# Patient Record
Sex: FEMALE | Race: BLACK OR AFRICAN AMERICAN | Employment: FULL TIME | URBAN - METROPOLITAN AREA
[De-identification: names, ages, dates, MRNs, and addresses within clinical notes are randomized per-mention and may not be internally consistent; named-entity substitution may affect disease eponyms.]

---

## 2017-09-19 ENCOUNTER — OFFICE VISIT (OUTPATIENT)
Dept: FAMILY MEDICINE | Facility: CLINIC | Age: 22
End: 2017-09-19
Payer: COMMERCIAL

## 2017-09-19 VITALS
DIASTOLIC BLOOD PRESSURE: 63 MMHG | WEIGHT: 144 LBS | TEMPERATURE: 97.9 F | BODY MASS INDEX: 21.33 KG/M2 | SYSTOLIC BLOOD PRESSURE: 108 MMHG | HEART RATE: 92 BPM | HEIGHT: 69 IN | OXYGEN SATURATION: 98 %

## 2017-09-19 DIAGNOSIS — J31.0 OTHER CHRONIC RHINITIS: ICD-10-CM

## 2017-09-19 DIAGNOSIS — Z00.00 ROUTINE GENERAL MEDICAL EXAMINATION AT A HEALTH CARE FACILITY: Primary | ICD-10-CM

## 2017-09-19 DIAGNOSIS — Z11.3 SCREEN FOR STD (SEXUALLY TRANSMITTED DISEASE): ICD-10-CM

## 2017-09-19 PROCEDURE — 87491 CHLMYD TRACH DNA AMP PROBE: CPT | Performed by: FAMILY MEDICINE

## 2017-09-19 PROCEDURE — 87591 N.GONORRHOEAE DNA AMP PROB: CPT | Performed by: FAMILY MEDICINE

## 2017-09-19 PROCEDURE — 99385 PREV VISIT NEW AGE 18-39: CPT | Performed by: FAMILY MEDICINE

## 2017-09-19 RX ORDER — BENZOCAINE/MENTHOL 6 MG-10 MG
LOZENGE MUCOUS MEMBRANE DAILY
COMMUNITY
End: 2020-03-09

## 2017-09-19 RX ORDER — FLUTICASONE PROPIONATE 50 MCG
1-2 SPRAY, SUSPENSION (ML) NASAL DAILY
Qty: 1 BOTTLE | Refills: 11 | Status: SHIPPED | OUTPATIENT
Start: 2017-09-19 | End: 2020-03-09
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
## 2017-09-19 NOTE — NURSING NOTE
"Chief Complaint   Patient presents with     Physical       Initial /63  Pulse 92  Temp 97.9  F (36.6  C) (Oral)  Ht 5' 9\" (1.753 m)  Wt 144 lb (65.3 kg)  SpO2 98%  Breastfeeding? No  BMI 21.27 kg/m2 Estimated body mass index is 21.27 kg/(m^2) as calculated from the following:    Height as of this encounter: 5' 9\" (1.753 m).    Weight as of this encounter: 144 lb (65.3 kg).  Medication Reconciliation: complete   Karen Anne MA      "

## 2017-09-19 NOTE — MR AVS SNAPSHOT
After Visit Summary   9/19/2017    Joseph Alfonso    MRN: [id number]           Patient Information     Date Of Birth          1995        Visit Information        Provider Department      9/19/2017 9:00 AM Jimmy Wells MD [facility]        Today's Diagnoses     Routine general medical examination at a health care facility    -  1    Other chronic rhinitis        Screen for STD (sexually transmitted disease)          Care Instructions      Preventive Health Recommendations  Female Ages 18 to 25     Yearly exam:     See your health care provider every year in order to  o Review health changes.   o Discuss preventive care.    o Review your medicines if your doctor has prescribed any.      You should be tested each year for STDs (sexually transmitted diseases).       After age 20, talk to your provider about how often you should have cholesterol testing.      Starting at age 21, get a Pap test every three years. If you have an abnormal result, your doctor may have you test more often.      If you are at risk for diabetes, you should have a diabetes test (fasting glucose).     Shots:     Get a flu shot each year.     Get a tetanus shot every 10 years.     Consider getting the shot (vaccine) that prevents cervical cancer (Gardasil).    Nutrition:     Eat at least 5 servings of fruits and vegetables each day.    Eat whole-grain bread, whole-wheat pasta and brown rice instead of white grains and rice.    Talk to your provider about Calcium and Vitamin D.     Lifestyle    Exercise at least 150 minutes a week each week (30 minutes a day, 5 days a week). This will help you control your weight and prevent disease.    Limit alcohol to one drink per day.    No smoking.     Wear sunscreen to prevent skin cancer.    See your dentist every six months for an exam and cleaning.          Follow-ups after your visit        Your next 10 appointments already scheduled     Nov 10, 2017  " 9:30 AM CST   (Arrive by 9:15 AM)   New Patient Visit with Tushar Lan MD   [facility] ([facility])    [address]  [address] [facility] [address] [phone]              Who to contact     If you have questions or need follow up information about today's clinic visit or your schedule please contact [facility] directly at [phone].  Normal or non-critical lab and imaging results will be communicated to you by [employer]hart, letter or phone within 4 business days after the clinic has received the results. If you do not hear from us within 7 days, please contact the clinic through [employer]hart or phone. If you have a critical or abnormal lab result, we will notify you by phone as soon as possible.  Submit refill requests through [employer] or call your pharmacy and they will forward the refill request to us. Please allow 3 business days for your refill to be completed.          Additional Information About Your Visit        [employer] Information     [employer] lets you send messages to your doctor, view your test results, renew your prescriptions, schedule appointments and more. To sign up, go to [email or web address] . Click on \"Log in\" on the left side of the screen, which will take you to the Welcome page. Then click on \"Sign up Now\" on the right side of the page.     You will be asked to enter the access code listed below, as well as some personal information. Please follow the directions to create your username and password.     Your access code is: [email or web address]  Expires: 2017  1:28 PM     Your access code will  in 90 days. If you need help or a new code, please call your [facility] or [phone].        Care EveryWhere ID     This is your Care EveryWhere ID. This could be used by other organizations to access your [facility] medical records  [id number]        Your Vitals Were     Pulse Temperature Height Pulse Oximetry " "Breastfeeding? BMI (Body Mass Index)    92 97.9  F (36.6  C) (Oral) 5' 9\" (1.753 m) 98% No 21.27 kg/m2       Blood Pressure from Last 3 Encounters:   09/19/17 108/63    Weight from Last 3 Encounters:   09/19/17 144 lb (65.3 kg)   05/23/14 135 lb (61.2 kg) (65 %)*     * Growth percentiles are based on [facility] 2-20 Years data.              We Performed the Following     Chlamydia trachomatis PCR     Neisseria gonorrhoeae PCR          Today's Medication Changes          These changes are accurate as of: 9/19/17  9:38 AM.  If you have any questions, ask your nurse or doctor.               Start taking these medicines.        Dose/Directions    fluticasone 50 MCG/ACT spray   Commonly known as:  FLONASE   Used for:  Other chronic rhinitis   Started by:  Jimmy Wells MD        Dose:  1-2 spray   Spray 1-2 sprays into both nostrils daily   Quantity:  1 Bottle   Refills:  11            Where to get your medicines      These medications were sent to [facility] - [place], [place] - [address]  [address], [facility] [address]     Phone:  [phone]     fluticasone 50 MCG/ACT spray                Primary Care Provider Office Phone # Fax #    Chele [address] [phone]       [address] [address]        Equal Access to Services     DIANE FARMER AH: Hadii misbah dumont hadasho Someaganali, waaxda luqadaha, qaybta kaalmada adeegyada, gin fay. So [facility] [phone].    ATENCIÓN: Si habla español, tiene a morrell disposición servicios gratuitos de asistencia lingüística. Llame al [phone].    We comply with applicable federal civil rights laws and [place] laws. We do not discriminate on the basis of race, color, national origin, age, disability sex, sexual orientation or gender identity.            Thank you!     Thank you for choosing [facility]  for your care. Our goal is " always to provide you with excellent care. Hearing back from our patients is one way we can continue to improve our services. Please take a few minutes to complete the written survey that you may receive in the mail after your visit with us. Thank you!             Your Updated Medication List - Protect others around you: Learn how to safely use, store and throw away your medicines at www.disposemymeds.org.          This list is accurate as of: 9/19/17  9:38 AM.  Always use your most recent med list.                   Brand Name Dispense Instructions for use Diagnosis    fluticasone 50 MCG/ACT spray    FLONASE    1 Bottle    Spray 1-2 sprays into both nostrils daily    Other chronic rhinitis       hydrocortisone 1 % cream    CORTAID     Apply topically daily Apply to face daily

## 2017-09-20 LAB
C TRACH DNA SPEC QL NAA+PROBE: NEGATIVE
N GONORRHOEA DNA SPEC QL NAA+PROBE: NEGATIVE
SPECIMEN SOURCE: NORMAL
SPECIMEN SOURCE: NORMAL

## 2017-09-23 ENCOUNTER — HEALTH MAINTENANCE LETTER (OUTPATIENT)
Age: 22
End: 2017-09-23

## 2017-10-03 ENCOUNTER — OFFICE VISIT (OUTPATIENT)
Dept: FAMILY MEDICINE | Facility: CLINIC | Age: 22
End: 2017-10-03
Payer: COMMERCIAL

## 2017-10-03 VITALS
SYSTOLIC BLOOD PRESSURE: 111 MMHG | OXYGEN SATURATION: 97 % | TEMPERATURE: 99 F | WEIGHT: 141.4 LBS | BODY MASS INDEX: 20.88 KG/M2 | HEART RATE: 90 BPM | DIASTOLIC BLOOD PRESSURE: 68 MMHG

## 2017-10-03 DIAGNOSIS — J01.90 ACUTE SINUSITIS WITH SYMPTOMS > 10 DAYS: Primary | ICD-10-CM

## 2017-10-03 DIAGNOSIS — L60.8 CHANGE IN NAIL APPEARANCE: ICD-10-CM

## 2017-10-03 PROCEDURE — 99213 OFFICE O/P EST LOW 20 MIN: CPT | Performed by: PHYSICIAN ASSISTANT

## 2017-10-03 NOTE — NURSING NOTE
"Chief Complaint   Patient presents with     Sinus Problem     Health Maintenance     HPV, Tetanus, Pap, and Influenza        Initial /68 (BP Location: Right arm, Patient Position: Chair, Cuff Size: Adult Regular)  Pulse 90  Temp 99  F (37.2  C) (Oral)  Wt 141 lb 6.4 oz (64.1 kg)  SpO2 97%  Breastfeeding? No  BMI 20.88 kg/m2 Estimated body mass index is 20.88 kg/(m^2) as calculated from the following:    Height as of 9/19/17: 5' 9\" (1.753 m).    Weight as of this encounter: 141 lb 6.4 oz (64.1 kg).  Medication Reconciliation: complete   KAITLYNN Dowell MA      "

## 2017-10-03 NOTE — MR AVS SNAPSHOT
After Visit Summary   10/3/2017    Joseph Alfonso    MRN: [id number]           Patient Information     Date Of Birth          1995        Visit Information        Provider Department      10/3/2017 11:20 AM Brenda Guzman PA-C [facility]        Today's Diagnoses     Acute sinusitis with symptoms > 10 days    -  1    Change in nail appearance          Care Instructions    Biotin           Follow-ups after your visit        Follow-up notes from your care team     Return in about 1 year (around 10/3/2018) for Physical Exam.      Your next 10 appointments already scheduled     Nov 10, 2017  9:30 AM CST   (Arrive by 9:15 AM)   New Patient Visit with Tushar Lan MD   [facility] ([facility])    [address] [place] [address]  3rd Floor  [facility] [address] [phone]              Who to contact     If you have questions or need follow up information about today's clinic visit or your schedule please contact [facility] directly at [phone].  Normal or non-critical lab and imaging results will be communicated to you by [employer], letter or phone within 4 business days after the clinic has received the results. If you do not hear from us within 7 days, please contact the clinic through [employer] or phone. If you have a critical or abnormal lab result, we will notify you by phone as soon as possible.  Submit refill requests through [employer] or call your pharmacy and they will forward the refill request to us. Please allow 3 business days for your refill to be completed.          Additional Information About Your Visit        MyChart Information     [employer] gives you secure access to your electronic health record. If you see a primary care provider, you can also send messages to your care team and make appointments. If you have questions, please call your primary care clinic.  If you do not have a primary care  provider, please call [phone] and they will assist you.        Care EveryWhere ID     This is your Care EveryWhere ID. This could be used by other organizations to access your [facility] medical records  [id number]        Your Vitals Were     Pulse Temperature Pulse Oximetry Breastfeeding? BMI (Body Mass Index)       90 99  F (37.2  C) (Oral) 97% No 20.88 kg/m2        Blood Pressure from Last 3 Encounters:   10/03/17 111/68   09/19/17 108/63    Weight from Last 3 Encounters:   10/03/17 141 lb 6.4 oz (64.1 kg)   09/19/17 144 lb (65.3 kg)   05/23/14 135 lb (61.2 kg) (65 %)*     * Growth percentiles are based on [facility] 2-20 Years data.              Today, you had the following     No orders found for display         Today's Medication Changes          These changes are accurate as of: 10/3/17 12:29 PM.  If you have any questions, ask your nurse or doctor.               Start taking these medicines.        Dose/Directions    amoxicillin-clavulanate 875-125 MG per tablet   Commonly known as:  AUGMENTIN   Used for:  Acute sinusitis with symptoms > 10 days   Started by:  Brenda Guzman PA-C        Dose:  1 tablet   Take 1 tablet by mouth 2 times daily   Quantity:  20 tablet   Refills:  0            Where to get your medicines      These medications were sent to [facility] [place], [place] - [address]  [address], [facility] [address]     Phone:  [phone]     amoxicillin-clavulanate 875-125 MG per tablet                Primary Care Provider Office Phone # Fax #    Chele [address] [phone]       [address] [address]        Equal Access to Services     DIANE FARMER AH: Katey Duque, walinetteda luqadaha, qaybta kaalmada ademalikyada, gin fay. So [facility] [phone].    ATENCIÓN: Si habla español, tiene a morrell disposición servicios gratuitos de asistencia  lingüística. Maria Fernanda al [phone].    We comply with applicable federal civil rights laws and [place] laws. We do not discriminate on the basis of race, color, national origin, age, disability, sex, sexual orientation, or gender identity.            Thank you!     Thank you for choosing [facility]  for your care. Our goal is always to provide you with excellent care. Hearing back from our patients is one way we can continue to improve our services. Please take a few minutes to complete the written survey that you may receive in the mail after your visit with us. Thank you!             Your Updated Medication List - Protect others around you: Learn how to safely use, store and throw away your medicines at www.disposemymeds.org.          This list is accurate as of: 10/3/17 12:29 PM.  Always use your most recent med list.                   Brand Name Dispense Instructions for use Diagnosis    amoxicillin-clavulanate 875-125 MG per tablet    AUGMENTIN    20 tablet    Take 1 tablet by mouth 2 times daily    Acute sinusitis with symptoms > 10 days       fluticasone 50 MCG/ACT spray    FLONASE    1 Bottle    Spray 1-2 sprays into both nostrils daily    Other chronic rhinitis       hydrocortisone 1 % cream    CORTAID     Apply topically daily Apply to face daily

## 2017-10-03 NOTE — PROGRESS NOTES
SUBJECTIVE:   Joseph Alfonso is a 22 year old female who presents to clinic today for the following health issues:      ENT Symptoms             Symptoms: cc Present Absent Comment   Fever/Chills   x    Fatigue  x     Muscle Aches   x    Eye Irritation   x    Sneezing   x    Nasal Alejandro/Drg  x     Sinus Pressure/Pain  x     Loss of smell  x     Dental pain  x     Sore Throat   x    Swollen Glands   x    Ear Pain/Fullness   x    Cough  x  That improved with flonase.     Wheeze  x     Chest Pain   x    Shortness of breath   x    Rash   x    Other   x      Symptom duration:  1 month 1 week   Symptom severity:  moderate   Treatments tried:  OTC Flonase helps a bit, OTC Asprin and Tylenol    Contacts:  Work      Hx of seasonal allergies but this feels different.    No hx of lung problems.    Works at a .        Thumb nails split and on toes too recently.  Hands x 6 months.  No hair changes.  Does have some hot and cold intolerance but this if for years.  No weight changes.  No blood or mucus in stools.      Problem list and histories reviewed & adjusted, as indicated.  Additional history: as documented    There is no problem list on file for this patient.    Past Surgical History:   Procedure Laterality Date     REMOVE TUBE, MYRINGOTOMY, COMBINED Bilateral        Social History   Substance Use Topics     Smoking status: Never Smoker     Smokeless tobacco: Never Used     Alcohol use Yes     Family History   Problem Relation Age of Onset     Asthma No family hx of      C.A.D. No family hx of      DIABETES No family hx of      Hypertension No family hx of              Reviewed and updated as needed this visit by clinical staffTobacco  Allergies  Meds  Med Hx  Surg Hx  Fam Hx  Soc Hx      Reviewed and updated as needed this visit by Provider         ROS:  As above    OBJECTIVE:     /68 (BP Location: Right arm, Patient Position: Chair, Cuff Size: Adult Regular)  Pulse 90  Temp 99  F (37.2  C) (Oral)   Wt 141 lb 6.4 oz (64.1 kg)  SpO2 97%  Breastfeeding? No  BMI 20.88 kg/m2  Body mass index is 20.88 kg/(m^2).  GENERAL: healthy, alert and no distress  HENT: ear canals and TM's normal, oropharynx clear, oral mucous membranes moist and congestion   NECK: no adenopathy and no asymmetry, masses, or scars  RESP: lungs clear to auscultation - no rales, rhonchi or wheezes  CV: regular rates and rhythm, normal S1 S2, no S3 or S4 and no murmur, click or rub  Skin: thin thumb nails     Diagnostic Test Results:  none     ASSESSMENT/PLAN:       1. Acute sinusitis with symptoms > 10 days  Follow up as needed  - amoxicillin-clavulanate (AUGMENTIN) 875-125 MG per tablet; Take 1 tablet by mouth 2 times daily  Dispense: 20 tablet; Refill: 0    2. Change in nail appearance  Likely related to contact of something or normal wear.  For now no concerns.        FUTURE APPOINTMENTS:       - Follow-up for annual visit or as needed    Brenda Guzman PA-C  [facility]

## 2018-01-24 ENCOUNTER — OFFICE VISIT (OUTPATIENT)
Dept: FAMILY MEDICINE | Facility: CLINIC | Age: 23
End: 2018-01-24
Payer: COMMERCIAL

## 2018-01-24 VITALS
SYSTOLIC BLOOD PRESSURE: 108 MMHG | TEMPERATURE: 97.7 F | OXYGEN SATURATION: 98 % | RESPIRATION RATE: 18 BRPM | DIASTOLIC BLOOD PRESSURE: 66 MMHG | BODY MASS INDEX: 20.92 KG/M2 | WEIGHT: 138 LBS | HEART RATE: 88 BPM | HEIGHT: 68 IN

## 2018-01-24 DIAGNOSIS — R07.0 THROAT PAIN: ICD-10-CM

## 2018-01-24 DIAGNOSIS — J01.20 ACUTE ETHMOIDAL SINUSITIS, RECURRENCE NOT SPECIFIED: Primary | ICD-10-CM

## 2018-01-24 DIAGNOSIS — R09.81 NASAL SINUS CONGESTION: ICD-10-CM

## 2018-01-24 PROCEDURE — 99213 OFFICE O/P EST LOW 20 MIN: CPT | Performed by: FAMILY MEDICINE

## 2018-01-24 RX ORDER — PSEUDOEPHEDRINE HCL 120 MG/1
120 TABLET, FILM COATED, EXTENDED RELEASE ORAL EVERY 12 HOURS
Qty: 28 TABLET | Refills: 0 | Status: SHIPPED | OUTPATIENT
Start: 2018-01-24 | End: 2020-03-09

## 2018-01-24 NOTE — PATIENT INSTRUCTIONS
Sinusitis (No Antibiotics)    The sinuses are air-filled spaces within the bones of the face. They connect to the inside of the nose. Sinusitis is an inflammation of the tissue lining the sinus cavity. Sinus inflammation can occur during a cold. It can also be due to allergies to pollens and other particles in the air. It can cause symptoms such as sinus congestion, headache, sore throat, facial swelling and fullness. It may also cause a low-grade fever. No infection is present, and no antibiotic treatment is needed.  Home care    Drink plenty of water, hot tea, and other liquids. This may help thin mucus. It also may promote sinus drainage.    Heat may help soothe painful areas of the face. Use a towel soaked in hot water. Or,  the shower and direct the hot spray onto your face. Using a vaporizer along with a menthol rub at night may also help.     An expectorant containing guaifenesin may help thin the mucus and promote drainage from the sinuses.    Over-the-counter decongestants may be used unless a similar medicine was prescribed. Nasal sprays work the fastest. Use one that contains phenylephrine or oxymetazoline. First blow the nose gently. Then use the spray. Do not use these medicines more often than directed on the label or symptoms may get worse. You may also use tablets containing pseudoephedrine. Avoid products that combine ingredients, because side effects may be increased. Read labels. You can also ask the pharmacist for help. (NOTE: Persons with high blood pressure should not use decongestants. They can raise blood pressure.)    Over-the-counter antihistamines may help if allergies contributed to your sinusitis.      Use acetaminophen or ibuprofen to control pain, unless another pain medicine was prescribed. (If you have chronic liver or kidney disease or ever had a stomach ulcer, talk with your doctor before using these medicines. Aspirin should never be used in anyone under 18 years of age  who is ill with a fever. It may cause severe liver damage.)    Use nasal rinses or irrigation as instructed by your health care provider.    Don't smoke. This can worsen symptoms.  Follow-up care  Follow up with your healthcare provider or our staff if you are not improving within the next week.  When to seek medical advice  Call your healthcare provider if any of these occur:    Green or yellow discharge from the nose or into the throat    Facial pain or headache becoming more severe    Stiff neck    Unusual drowsiness or confusion    Swelling of the forehead or eyelids    Vision problems, including blurred or double vision    Fever of 100.4 F (38 C) or higher, or as directed by your healthcare provider    Seizure    Breathing problems    Symptoms not resolving within 10 days  Date Last Reviewed: 4/13/2015 2000-2017 [employer]. [address] [place] [address]. All rights reserved. This information is not intended as a substitute for professional medical care. Always follow your healthcare professional's instructions.      [facility]    If you have any questions regarding to your visit please contact your care team:       Team Purple:   Clinic Hours Telephone Number   Dr. Sruthi Suarez   7am-7pm  Monday - Thursday   7am-5pm  Fridays  [phone]  (Appointment scheduling available 24/7)    Questions about your Visit?   Team Line:  [phone]   Urgent Care - [facility] - 11am-9pm Monday-Friday Saturday-Sunday- 9am-5pm   [place] - 5pm-9pm Monday-Friday Saturday-Sunday- 9am-5pm  [phone] - [place]   [phone] - [place]       What options do I have for visits at the clinic other than the traditional office visit?  To expand how we care for you, many of our providers are utilizing electronic visits (e-visits) and telephone visits, when medically appropriate, for interactions  with their patients rather than a visit in the clinic.   We also offer nurse visits for many medical concerns. Just like any other service, we will bill your insurance company for this type of visit based on time spent on the phone with your provider. Not all insurance companies cover these visits. Please check with your medical insurance if this type of visit is covered. You will be responsible for any charges that are not paid by your insurance.      E-visits via [employer]:  generally incur a $35.00 fee.  Telephone visits:  Time spent on the phone: *charged based on time that is spent on the phone in increments of 10 minutes. Estimated cost:   5-10 mins $30.00   11-20 mins. $59.00   21-30 mins. $85.00     Use [employer] (secure email communication and access to your chart) to send your primary care provider a message or make an appointment. Ask someone on your Team how to sign up for [employer].  For a Price Quote for your services, please call our Consumer Price Line at [phone].  As always, Thank you for trusting us with your health care needs!  Adriana Martinez

## 2018-01-24 NOTE — PROGRESS NOTES
"  SUBJECTIVE:   Joseph Alfonso is a 22 year old female who presents to clinic today for the following health issues:    RESPIRATORY SYMPTOMS      Duration: 3 days    Description  nasal congestion, rhinorrhea, sore throat, facial pain/pressure, cough, wheezing, fever, chills, headache, fatigue/malaise, hoarse voice dizziness and headaches    Severity: moderate    Accompanying signs and symptoms: None    History (predisposing factors):  none    Precipitating or alleviating factors: None    Therapies tried and outcome:  rest and fluids    PROBLEMS TO ADD ON...    Problem list and histories reviewed & adjusted, as indicated.  Additional history: as documented    There is no problem list on file for this patient.    Past Surgical History:   Procedure Laterality Date     REMOVE TUBE, MYRINGOTOMY, COMBINED Bilateral        Social History   Substance Use Topics     Smoking status: Never Smoker     Smokeless tobacco: Never Used     Alcohol use Yes     Family History   Problem Relation Age of Onset     Asthma No family hx of      C.A.D. No family hx of      DIABETES No family hx of      Hypertension No family hx of          Reviewed and updated as needed this visit by clinical staff  Tobacco  Allergies  Meds       ROS:  Constitutional, cardiovascular, pulmonary, gi and gu systems are negative, except as otherwise noted.    OBJECTIVE:     /66  Pulse 88  Temp 97.7  F (36.5  C) (Oral)  Resp 18  Ht 5' 8.42\" (1.738 m)  Wt 138 lb (62.6 kg)  SpO2 98%  BMI 20.72 kg/m2  Body mass index is 20.72 kg/(m^2).  GENERAL: healthy, alert and no distress  HENT: ear canals and TM's normal. Nasal and sinus congestion, tenderness over the ethmoid sinuses.   NECK: no adenopathy and thyroid normal to palpation  RESP: lungs clear to auscultation - no rales, rhonchi or wheezes  CV: regular rate and rhythm, no murmur, click or rub, no peripheral edema   MS: no gross musculoskeletal defects noted, no edema    Diagnostic Test " Results:  none     ASSESSMENT/PLAN:     (J01.20) Acute ethmoidal sinusitis, recurrence not specified  (primary encounter diagnosis)  Comment: I discussed the pathophysiology of sinus pressure/sinusitis and treatment options with emphasis on healty lifestyle and opening up natural drainage passages.  I discussed the risks and benefits of various over the counter and prescription treatments including short courses of decongestant nasal sprays.  Recheck if not improving as expected  Plan: pseudoePHEDrine (SUDAFED) 120 MG 12 hr tablet            Flonase.    (R09.81) Nasal sinus congestion  Plan: pseudoePHEDrine (SUDAFED) 120 MG 12 hr tablet            Flonase.    (R07.0) Throat pain  Comment: From drainage    Call or return to clinic prn if these symptoms worsen or fail to improve as anticipated in 1 week will consider adding antibiotic.    Geovanny Lester MD  [facility]

## 2018-01-24 NOTE — MR AVS SNAPSHOT
After Visit Summary   1/24/2018    Joseph Alfonso    MRN: [id number]           Patient Information     Date Of Birth          1995        Visit Information        Provider Department      1/24/2018 11:40 AM Geovanny Lester MD [facility]        Today's Diagnoses     Acute ethmoidal sinusitis, recurrence not specified    -  1    Nasal sinus congestion        Throat pain          Care Instructions      Sinusitis (No Antibiotics)    The sinuses are air-filled spaces within the bones of the face. They connect to the inside of the nose. Sinusitis is an inflammation of the tissue lining the sinus cavity. Sinus inflammation can occur during a cold. It can also be due to allergies to pollens and other particles in the air. It can cause symptoms such as sinus congestion, headache, sore throat, facial swelling and fullness. It may also cause a low-grade fever. No infection is present, and no antibiotic treatment is needed.  Home care    Drink plenty of water, hot tea, and other liquids. This may help thin mucus. It also may promote sinus drainage.    Heat may help soothe painful areas of the face. Use a towel soaked in hot water. Or,  the shower and direct the hot spray onto your face. Using a vaporizer along with a menthol rub at night may also help.     An expectorant containing guaifenesin may help thin the mucus and promote drainage from the sinuses.    Over-the-counter decongestants may be used unless a similar medicine was prescribed. Nasal sprays work the fastest. Use one that contains phenylephrine or oxymetazoline. First blow the nose gently. Then use the spray. Do not use these medicines more often than directed on the label or symptoms may get worse. You may also use tablets containing pseudoephedrine. Avoid products that combine ingredients, because side effects may be increased. Read labels. You can also ask the pharmacist for help. (NOTE: Persons with high blood  pressure should not use decongestants. They can raise blood pressure.)    Over-the-counter antihistamines may help if allergies contributed to your sinusitis.      Use acetaminophen or ibuprofen to control pain, unless another pain medicine was prescribed. (If you have chronic liver or kidney disease or ever had a stomach ulcer, talk with your doctor before using these medicines. Aspirin should never be used in anyone under 18 years of age who is ill with a fever. It may cause severe liver damage.)    Use nasal rinses or irrigation as instructed by your health care provider.    Don't smoke. This can worsen symptoms.  Follow-up care  Follow up with your healthcare provider or our staff if you are not improving within the next week.  When to seek medical advice  Call your healthcare provider if any of these occur:    Green or yellow discharge from the nose or into the throat    Facial pain or headache becoming more severe    Stiff neck    Unusual drowsiness or confusion    Swelling of the forehead or eyelids    Vision problems, including blurred or double vision    Fever of 100.4 F (38 C) or higher, or as directed by your healthcare provider    Seizure    Breathing problems    Symptoms not resolving within 10 days  Date Last Reviewed: 4/13/2015 2000-2017 [employer]. [address] [place] [address]. All rights reserved. This information is not intended as a substitute for professional medical care. Always follow your healthcare professional's instructions.      [facility]    If you have any questions regarding to your visit please contact your care team:       Team Purple:   Clinic Hours Telephone Number   Dr. Sruthi Suarez   7am-7pm  Monday - Thursday   7am-5pm  Fridays  [phone]  (Appointment scheduling available 24/7)    Questions about your Visit?   Team Line:  [phone]   Urgent Care - [place] and  [place] - 11am-9pm Monday-Friday Saturday-Sunday- 9am-5pm   [place] - 5pm-9pm Monday-Friday Saturday-Sunday- 9am-5pm  [phone] - [place]   [phone] - [place]       What options do I have for visits at the clinic other than the traditional office visit?  To expand how we care for you, many of our providers are utilizing electronic visits (e-visits) and telephone visits, when medically appropriate, for interactions with their patients rather than a visit in the clinic.   We also offer nurse visits for many medical concerns. Just like any other service, we will bill your insurance company for this type of visit based on time spent on the phone with your provider. Not all insurance companies cover these visits. Please check with your medical insurance if this type of visit is covered. You will be responsible for any charges that are not paid by your insurance.      E-visits via [employer]:  generally incur a $35.00 fee.  Telephone visits:  Time spent on the phone: *charged based on time that is spent on the phone in increments of 10 minutes. Estimated cost:   5-10 mins $30.00   11-20 mins. $59.00   21-30 mins. $85.00     Use [employer] (secure email communication and access to your chart) to send your primary care provider a message or make an appointment. Ask someone on your Team how to sign up for [employer].  For a Price Quote for your services, please call our Consumer Price Line at [phone].  As always, Thank you for trusting us with your health care needs!  Adriana CRUZ - Juan          Follow-ups after your visit        Who to contact     If you have questions or need follow up information about today's clinic visit or your schedule please contact [facility] directly at [phone].  Normal or non-critical lab and imaging results will be communicated to you by MyChart, letter or phone within 4 business days after the clinic has received the results. If you do not hear from us  "within 7 days, please contact the clinic through [employer] or phone. If you have a critical or abnormal lab result, we will notify you by phone as soon as possible.  Submit refill requests through [employer] or call your pharmacy and they will forward the refill request to us. Please allow 3 business days for your refill to be completed.          Additional Information About Your Visit        [employer] Information     [employer] gives you secure access to your electronic health record. If you see a primary care provider, you can also send messages to your care team and make appointments. If you have questions, please call your primary care clinic.  If you do not have a primary care provider, please call [phone] and they will assist you.        Care EveryWhere ID     This is your Care EveryWhere ID. This could be used by other organizations to access your [facility] medical records  [id number]        Your Vitals Were     Pulse Temperature Respirations Height Pulse Oximetry BMI (Body Mass Index)    88 97.7  F (36.5  C) (Oral) 18 5' 8.42\" (1.738 m) 98% 20.72 kg/m2       Blood Pressure from Last 3 Encounters:   01/24/18 108/66   10/03/17 111/68   09/19/17 108/63    Weight from Last 3 Encounters:   01/24/18 138 lb (62.6 kg)   10/03/17 141 lb 6.4 oz (64.1 kg)   09/19/17 144 lb (65.3 kg)              Today, you had the following     No orders found for display         Today's Medication Changes          These changes are accurate as of 1/24/18 11:55 AM.  If you have any questions, ask your nurse or doctor.               Start taking these medicines.        Dose/Directions    pseudoePHEDrine 120 MG 12 hr tablet   Commonly known as:  SUDAFED   Used for:  Acute ethmoidal sinusitis, recurrence not specified, Nasal sinus congestion   Started by:  Geovanny Lester MD        Dose:  120 mg   Take 1 tablet (120 mg) by mouth every 12 hours   Quantity:  28 tablet   Refills:  0            Where to get your medicines      Some of " these will need a paper prescription and others can be bought over the counter.  Ask your nurse if you have questions.     Bring a paper prescription for each of these medications     pseudoePHEDrine 120 MG 12 hr tablet                Primary Care Provider Office Phone # Fax #    Chele RIOS [phone]       [address] [address]        Equal Access to Services     DIANE FARMER : Hadii aad ku hadasho Soomaali, waaxda luqadaha, qaybta kaalmada adeegyada, waxay idiin hayaan adeeg kharash lathun ah. So [facility] [phone].    ATENCIÓN: Si habla espkomal, tiene a morrell disposición servicios gratuitos de asistencia lingüística. Maria Fernanda al [phone].    We comply with applicable federal civil rights laws and [place] laws. We do not discriminate on the basis of race, color, national origin, age, disability, sex, sexual orientation, or gender identity.            Thank you!     Thank you for choosing [facility]  for your care. Our goal is always to provide you with excellent care. Hearing back from our patients is one way we can continue to improve our services. Please take a few minutes to complete the written survey that you may receive in the mail after your visit with us. Thank you!             Your Updated Medication List - Protect others around you: Learn how to safely use, store and throw away your medicines at www.disposemymeds.org.          This list is accurate as of 1/24/18 11:55 AM.  Always use your most recent med list.                   Brand Name Dispense Instructions for use Diagnosis    amoxicillin-clavulanate 875-125 MG per tablet    AUGMENTIN    20 tablet    Take 1 tablet by mouth 2 times daily    Acute sinusitis with symptoms > 10 days       fluticasone 50 MCG/ACT spray    FLONASE    1 Bottle    Spray 1-2 sprays into both nostrils daily    Other chronic rhinitis       hydrocortisone 1 % cream    CORTAID     Apply topically daily Apply  to face daily        pseudoePHEDrine 120 MG 12 hr tablet    SUDAFED    28 tablet    Take 1 tablet (120 mg) by mouth every 12 hours    Acute ethmoidal sinusitis, recurrence not specified, Nasal sinus congestion

## 2019-03-22 ENCOUNTER — HEALTH MAINTENANCE LETTER (OUTPATIENT)
Age: 24
End: 2019-03-22

## 2019-09-29 ENCOUNTER — HEALTH MAINTENANCE LETTER (OUTPATIENT)
Age: 24
End: 2019-09-29

## 2020-03-10 ENCOUNTER — OFFICE VISIT (OUTPATIENT)
Dept: FAMILY MEDICINE | Facility: CLINIC | Age: 25
End: 2020-03-10
Payer: OTHER MISCELLANEOUS

## 2020-03-10 VITALS
BODY MASS INDEX: 22.14 KG/M2 | DIASTOLIC BLOOD PRESSURE: 64 MMHG | HEART RATE: 73 BPM | WEIGHT: 149.5 LBS | SYSTOLIC BLOOD PRESSURE: 114 MMHG | OXYGEN SATURATION: 98 % | RESPIRATION RATE: 14 BRPM | TEMPERATURE: 97.9 F | HEIGHT: 69 IN

## 2020-03-10 DIAGNOSIS — S69.91XD THUMB INJURY, RIGHT, SUBSEQUENT ENCOUNTER: Primary | ICD-10-CM
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
## 2020-03-10 PROCEDURE — 99213 OFFICE O/P EST LOW 20 MIN: CPT | Performed by: FAMILY MEDICINE

## 2020-03-10 RX ORDER — NORETHINDRONE ACETATE AND ETHINYL ESTRADIOL 1; 5 MG/1; UG/1
1 TABLET ORAL DAILY
COMMUNITY
End: 2021-03-15

## 2020-03-10 ASSESSMENT — MIFFLIN-ST. JEOR: SCORE: 1492.51

## 2020-03-10 NOTE — LETTER
[place] [address] [address] [facility] [address]  Phone: [phone]    March 10, 2020        Joseph Alfonso  [address] [place] [id number]    To whom it may concern:    RE: Joseph Alfonso    Patient was seen and treated today at our clinic. 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 Further evaluation by orthopedist     Please contact me for questions or concerns.      Sincerely,        Bebe Mcginnis MD

## 2020-03-10 NOTE — PATIENT INSTRUCTIONS
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

## 2020-03-10 NOTE — PROGRESS NOTES
Subjective     Joseph Alfonso is a 24 year old female who presents to clinic today for the following health issues:    HPI     Work injury     Musculoskeletal problem/pain    Duration: 2/28/2020    Description  Location:  Right thumb     Intensity:  mild    Accompanying signs and symptoms: tingling and weakness of thumb     History  Previous similar problem: no   Previous evaluation:  none    Precipitating or alleviating factors:  Trauma or overuse: YES- at work/ on the job  Aggravating factors include:  Range of motion    Therapies tried and outcome: rest/inactivity, immobilization and Ibuprofen  Joseph Alfonso is a 24 y.O. female with no known pmhx who presented on 2/28/2020 7:59 PM to [facility] with acute onset right thumb pain with hyperextension while working. 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 She felt a pop but was not sure if it was her thumb or the person's head. Had not broken this thumb before. No orthopedic surgeries in the past/ EXAM showed normal Vital signs per nursing. HEENT - nl TORSO - soft LIMBS - tender ulnar colat of mcp of thumb NEURO normal . X-ray showed no osseous fracture.  Was dx with right thumb sprain & given a thumb spica splint and advised to wear awake and asleep for 1 week with pain control with Tylenol & ibuprofen and follow-up in occupational medicine clinic.    She is new to this clinic.  Limited records.  Prior bilateral myringotomy and wisdom tooth extraction.  History of laceration repair left hand fingers.  Seen September 2017 for rhinitis in October 2017 for sinusitis and 1/2418 for sinusitis.  Doctoring elsewhere recently moved from [place].  [place]  negative.    Did not go to occupational medicine instead made an appointment to see me today for follow-up.  Reports has no swelling or redness or warmth has been using the thumb spica splint removing it for clothing and cares.  Feels a  tightness and then discomfort when she tries to touch her thumb to her other fingers or if something brushes against it.  She is right-handed.  She has been unable to work safely as a  due to the splint on her arm.  She has been using tylenol / motrin usually in am when hurts the most. No numbness, some tingling on touching or if accidentally brush's against something.    She has chronic intermittent low back discomfort and she does exercises to feel better and wondering if she can do them if it does not involve her thumb has held off from doing anything for the past 1 week.    There is no problem list on file for this patient.    Past Surgical History:   Procedure Laterality Date     AS REMOVAL OF NAIL PLATE SIMPLE SINGLE       HC TOOTH EXTRACTION W/FORCEP       REMOVE TUBE, MYRINGOTOMY, COMBINED Bilateral        Social History     Tobacco Use     Smoking status: Never Smoker     Smokeless tobacco: Never Used   Substance Use Topics     Alcohol use: Yes     Family History   Problem Relation Age of Onset     Cataracts Maternal Grandmother      Asthma No family hx of      C.A.D. No family hx of      Diabetes No family hx of      Hypertension No family hx of          Current Outpatient Medications   Medication Sig Dispense Refill     norethindrone-ethinyl estradiol (FEMHRT 1/5) 1-5 MG-MCG tablet Take 1 tablet by mouth daily       No Known Allergies  No lab results found.   BP Readings from Last 3 Encounters:   03/10/20 114/64   01/24/18 108/66   10/03/17 111/68    Wt Readings from Last 3 Encounters:   03/10/20 67.8 kg (149 lb 8 oz)   01/24/18 62.6 kg (138 lb)   10/03/17 64.1 kg (141 lb 6.4 oz)         Reviewed and updated as needed this visit by Provider         Review of Systems   ROS COMP: Constitutional, HEENT, cardiovascular, pulmonary, GI, , musculoskeletal, neuro, skin, endocrine and psych systems are negative, except as otherwise noted.      Objective    /64 (BP Location: Right arm, Patient  "Position: Chair, Cuff Size: Adult Regular)   Pulse 73   Temp 97.9  F (36.6  C) (Tympanic)   Resp 14   Ht 1.753 m (5' 9\")   Wt 67.8 kg (149 lb 8 oz)   LMP 02/25/2020 (Approximate)   SpO2 98%   BMI 22.08 kg/m    Body mass index is 22.08 kg/m .  Physical Exam   GENERAL: healthy, alert and no distress  EYES: Eyes grossly normal to inspection, PERRL and conjunctivae and sclerae normal  RESP: lungs clear to auscultation - no rales, rhonchi or wheezes  CV: regular rate and rhythm, normal S1 S2, no S3 or S4, no murmur, click or rub, no peripheral edema and peripheral pulses strong  MS: Right upper extremity has a thumb spica removable splint on.  This was removed and no significant swelling noted thenar eminence.  Point discomfort at the thenar eminence base of the thumb.  No bruising or redness or warmth noted.  Passive and active range of motion is intact though mildly uncomfortable and able to touch the tips of her fingers but does not feel right to her.  SKIN: no suspicious lesions or rashes  NEURO: Normal strength and tone, mentation intact and speech normal  PSYCH: mentation appears normal, affect normal/bright    Diagnostic Test Results:  Labs reviewed in Epic  No results found for this or any previous visit (from the past 24 hour(s)).      X-ray from care everywhere reviewed.    Assessment & Plan       ICD-10-CM    1. Thumb injury, right, subsequent encounter  S69.91XD Orthopedic & Spine  Referral   2. Work related injury  Y99.0 Orthopedic & Spine  Referral     Hyperextension right thumb injury and sprain.  Work-related.  Date of injury was 28 February.  Has been wearing a thumb spica splint for 1 week.  Discomfort is bearable with Tylenol Motrin that she takes daily in the morning.  Examination is currently unremarkable.  For injuries amenable to non surgical management (ie, no significant avulsion injury, no significant joint instability, normal radiographs, and no other concerning " clinical findings), the thumb should be protected from valgus stress for at least three weeks, after which gentle passive and active range of motion exercises may be performed out of the splint, along with isometric strengthening of thumb flexors and intrinsic muscles of the hand (pinch and handgrip). Repeat valgus testing should be performed at this time. If significant instability continues to exist, surgical referral is indicated.  Immobilization should continue for another three weeks when the patient is not performing exercises, until swelling and pain have completely subsided. Once the splint is discontinued, patients should be advised to avoid heavy gripping or grasping until  strength has returned to normal. Assuming the patient regains normal function of the thumb MCP joint following non surgical management and there is no instability, no additional imaging or referral is necessary.  Recommend to wear a thumb spica splint for 2 more weeks.  Given her work as a  I will refer to orthopedics to comment on when she can remove the splint and when can actively and safely use it.  In the meantime continue with ice and heat and contrast baths.  Tylenol and Motrin for pain relief and a note was given to work regarding workability and restrictions and that further assessment needs to come from the hand orthopedist.  May need referral to physical therapy in the future.    See Patient Instructions    Return in about 1 week (around 3/17/2020), or if symptoms worsen or fail to improve, for Routine Visit for chronic issues with PCP, Physical Exam with PCP.    Bebe Mcginnis MD  [facility]

## 2020-03-19 NOTE — TELEPHONE ENCOUNTER
DIAGNOSIS: Thumb injury, right/Bebe Mcginnis MD/no images/WC/ortho con   APPOINTMENT DATE: 4/20   NOTES STATUS DETAILS   OFFICE NOTE from referring provider Internal Bebe Mcginnis MD   OFFICE NOTE from other specialist N/A    DISCHARGE SUMMARY from hospital N/A    DISCHARGE REPORT from the ER Care Everywhere [facility]-2/28/20   OPERATIVE REPORT N/A    MEDICATION LIST Internal    MRI N/A    CT SCAN N/A    XRAYS (IMAGES & REPORTS) recieved [facility]-2/28/20         Sent fax request to [facility] for imaging 3/19

## 2020-04-09 ENCOUNTER — TELEPHONE (OUTPATIENT)
Dept: ORTHOPEDICS | Facility: CLINIC | Age: 25
End: 2020-04-09

## 2020-04-09 NOTE — TELEPHONE ENCOUNTER
As per Dr. Amaro' appt note, he would like Joseph to be seen slightly sooner than 4/20 to assess for potential UCL injury.  Left Walk-In number and will MyChart.     - Vaibhav MCCRAY ATC

## 2020-04-09 NOTE — TELEPHONE ENCOUNTER
LVM to call back. Need to move appointment into [facility] acute clinic for a face to face visit per Dr. Amaro. Can be scheduled with any provider in [facility] acute clinic.

## 2020-04-11 ENCOUNTER — OFFICE VISIT (OUTPATIENT)
Dept: ORTHOPEDICS | Facility: CLINIC | Age: 25
End: 2020-04-11
Payer: COMMERCIAL

## 2020-04-11 VITALS — WEIGHT: 150 LBS | HEIGHT: 69 IN | BODY MASS INDEX: 22.22 KG/M2
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
## 2020-04-11 DIAGNOSIS — S69.91XD THUMB INJURY, RIGHT, SUBSEQUENT ENCOUNTER: Primary | ICD-10-CM

## 2020-04-11 ASSESSMENT — MIFFLIN-ST. JEOR: SCORE: 1494.78

## 2020-04-11 NOTE — PROGRESS NOTES
SPORTS & ORTHOPEDIC WALK-IN VISIT 4/11/2020    Primary Care Physician: Dr. Sutherland    2/28/20 - was working as a , struggling getting an individual into a car and they pushed her thumb back into hyperextension.  For the last month, she has been soaking in epsom salt.   Has been doing light opposition, was recommended to not do anything that would cause some pain.    Most of the pain is located at the CMC.  Does not have pain unless gripping something heavy.    Reason for visit:     What part of your body is injured / painful?  right thumb    What caused the injury /pain?  - incident with back of police car    How long ago did your injury occur or pain begin? about a month ago    What are your most bothersome symptoms? Pain    How would you characterize your symptom?  aching and dull    What makes your symptoms better? Rest    What makes your symptoms worse? Other: gripping    Have you been previously seen for this problem? Yes, ED and FP    Medical History:    Any recent changes to your medical history? No    Any new medication prescribed since last visit? No    Have you had surgery on this body part before? No    Social History:    Occupation:     Handedness: Right    Exercise: work, core stability for prior low back injury    Review of Systems:    Do you have fever, chills, weight loss? No    Do you have any vision problems? No    Do you have any chest pain or edema? No    Do you have any shortness of breath or wheezing?  No    Do you have stomach problems? No    Do you have any numbness or focal weakness? No    Do you have diabetes? No    Do you have problems with bleeding or clotting? No    Do you have an rashes or other skin lesions? No

## 2020-04-11 NOTE — LETTER
4/11/2020       RE: Joseph Alfonso  [address]  [place] [address]     Dear Colleague,    Thank you for referring your patient, Joseph Alfonso, to the [facility] SPORTS AND ORTHOPAEDIC WALK IN CLINIC at [facility]. Please see a copy of my visit note below.          SPORTS & ORTHOPEDIC WALK-IN VISIT 4/11/2020    Primary Care Physician: Dr. Sutherland    2/28/20 - was working as a , struggling getting an individual into a car and they pushed her thumb back into hyperextension.  For the last month, she has been soaking in epsom salt.   Has been doing light opposition, was recommended to not do anything that would cause some pain.    Most of the pain is located at the CMC.  Does not have pain unless gripping something heavy.    Reason for visit:     What part of your body is injured / painful?  right thumb    What caused the injury /pain?  - incident with back of police car    How long ago did your injury occur or pain begin? about a month ago    What are your most bothersome symptoms? Pain    How would you characterize your symptom?  aching and dull    What makes your symptoms better? Rest    What makes your symptoms worse? Other: gripping    Have you been previously seen for this problem? Yes, ED and FP    Medical History:    Any recent changes to your medical history? No    Any new medication prescribed since last visit? No    Have you had surgery on this body part before? No    Social History:    Occupation:     Handedness: Right    Exercise: work, core stability for prior low back injury    Review of Systems:    Do you have fever, chills, weight loss? No    Do you have any vision problems? No    Do you have any chest pain or edema? No    Do you have any shortness of breath or wheezing?  No    Do you have stomach problems? No    Do you have any numbness or focal weakness? No    Do you have diabetes? No    Do you have problems  "with bleeding or clotting? No    Do you have an rashes or other skin lesions? No           CHIEF COMPLAINT:  Pain of the Right Thumb       HISTORY OF PRESENT ILLNESS  Ms. Alfonso is a pleasant 24 year old year old female who presents to clinic today with pain of right thumb.  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  She believes her thumb hyperextended.  Felt pop but unsure if it was related to her thumb or person's head striking her.      Onset: sudden  Location: right thumb near thenar region  Quality:  aching  Duration: 1.5 months   Severity: moderate at worst  Timing:improving overall  Modifying factors:  resting and non-use makes it better, movement and use makes it worse  Associated signs & symptoms: No swelling or ecchymosis after injury or currently  Previous similar pain: Yes  Treatments to date:Thumb spica brace, XR at [facility] ED, telephone visit with Dr. Amaro    Additional history: as documented    MEDICAL HISTORY  There is no problem list on file for this patient.      Current Outpatient Medications   Medication Sig Dispense Refill     norethindrone-ethinyl estradiol (FEMHRT 1/5) 1-5 MG-MCG tablet Take 1 tablet by mouth daily         No Known Allergies    Family History   Problem Relation Age of Onset     Cataracts Maternal Grandmother      Asthma No family hx of      C.A.D. No family hx of      Diabetes No family hx of      Hypertension No family hx of        Additional medical/Social/Surgical histories reviewed in [facility] and updated as appropriate.     REVIEW OF SYSTEMS (4/13/2020)  A 10-point review of systems was obtained and is negative except for as noted in the HPI.      PHYSICAL EXAM  Ht 1.753 m (5' 9\")   Wt 68 kg (150 lb)   BMI 22.15 kg/m      General  - normal appearance, in no obvious distress  HEENT  - conjunctivae not injected, moist mucous membranes  CV  - normal radial pulse  Pulm  - normal respiratory pattern, " non-labored  Musculoskeletal -right thumb  - inspection: no atrophy, normal joint alignment, no swelling  - palpation: no CMC tenderness, no soft tissue tenderness, no tenderness at the anatomical snuffbox, nontender at IP joint circumferentially.  Tenderness greatest in central thenar eminence.  - ROM:  MCP 70 deg flexion   0 deg extension   IP 90 deg flexion   0 deg extension  - strength: Pain with resisted flexion.  4/5 strength.  5/5 remaining planes.  - special tests IP joint  (-) varus  (-) valgus  Neuro  - no numbness, no motor deficit, grossly normal coordination, normal muscle tone  Skin  - no ecchymosis, erythema, warmth, or induration, no obvious rash  Psych  - interactive, appropriate, normal mood and affect    IMAGING :  Interface, Radiology-[facility]-In - 02/28/2020  8:47 PM CST  Indication: Thumb hyper-extended      Comparison: None    Findings: 3 views of the right thumb are obtained. No acute fracture identified. No significant soft tissue swelling..    IMPRESSION  Impression:  No acute osseous abnormality.    Reading Radiologist: King Cervantes      ASSESSMENT & PLAN  Ms. Alfonso is a 24 year old year old female who presents to clinic today with subacute right thumb pain over the past 1.5 months, believed to be a hyperextension injury.  No pain or laxity at IP joint to suggest UCL sprain/injury at this time.  Tenderness and pain concentrates in region of thenar eminence.  Suspected thenar muscle injury.    Diagnosis: Acute pain of right thumb    -Thumb spica brace as needed for painful activity; will request thermoplast splint with OT  -Hand therapy referral for progressing HEP  -Continue limited HEP provided  -Ice, analgesics OTC as needed  -Work related injury; provided fax number for any paperwork  -Follow up 4 weeks if persisting    It was a pleasure seeing Joseph today.    Javier Christianson, DO, CAQSM  Primary Care Sports Medicine      Again, thank you for allowing me to participate in the care of your  patient.      Sincerely,    Javier Christianson, DO

## 2020-04-13 NOTE — PROGRESS NOTES
"CHIEF COMPLAINT:  Pain of the Right Thumb       HISTORY OF PRESENT ILLNESS  Ms. Alfonso is a pleasant 24 year old year old female who presents to clinic today with pain of right thumb.  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  She believes her thumb hyperextended.  Felt pop but unsure if it was related to her thumb or person's head striking her.      Onset: sudden  Location: right thumb near thenar region  Quality:  aching  Duration: 1.5 months   Severity: moderate at worst  Timing:improving overall  Modifying factors:  resting and non-use makes it better, movement and use makes it worse  Associated signs & symptoms: No swelling or ecchymosis after injury or currently  Previous similar pain: Yes  Treatments to date:Thumb spica brace, XR at [facility] ED, telephone visit with Dr. Amaro    Additional history: as documented    MEDICAL HISTORY  There is no problem list on file for this patient.      Current Outpatient Medications   Medication Sig Dispense Refill     norethindrone-ethinyl estradiol (FEMHRT 1/5) 1-5 MG-MCG tablet Take 1 tablet by mouth daily         No Known Allergies    Family History   Problem Relation Age of Onset     Cataracts Maternal Grandmother      Asthma No family hx of      C.A.D. No family hx of      Diabetes No family hx of      Hypertension No family hx of        Additional medical/Social/Surgical histories reviewed in [employer] and updated as appropriate.     REVIEW OF SYSTEMS (4/13/2020)  A 10-point review of systems was obtained and is negative except for as noted in the HPI.      PHYSICAL EXAM  Ht 1.753 m (5' 9\")   Wt 68 kg (150 lb)   BMI 22.15 kg/m      General  - normal appearance, in no obvious distress  HEENT  - conjunctivae not injected, moist mucous membranes  CV  - normal radial pulse  Pulm  - normal respiratory pattern, non-labored  Musculoskeletal -right thumb  - inspection: no atrophy, normal joint alignment, no " swelling  - palpation: no CMC tenderness, no soft tissue tenderness, no tenderness at the anatomical snuffbox, nontender at IP joint circumferentially.  Tenderness greatest in central thenar eminence.  - ROM:  MCP 70 deg flexion   0 deg extension   IP 90 deg flexion   0 deg extension  - strength: Pain with resisted flexion.  4/5 strength.  5/5 remaining planes.  - special tests IP joint  (-) varus  (-) valgus  Neuro  - no numbness, no motor deficit, grossly normal coordination, normal muscle tone  Skin  - no ecchymosis, erythema, warmth, or induration, no obvious rash  Psych  - interactive, appropriate, normal mood and affect    IMAGING :  Interface, Radiology-[facility] - 02/28/2020  8:47 PM CST  Indication: Thumb hyper-extended      Comparison: None    Findings: 3 views of the right thumb are obtained. No acute fracture identified. No significant soft tissue swelling..    IMPRESSION  Impression:  No acute osseous abnormality.    Reading Radiologist: King Cervantes      ASSESSMENT & PLAN  Ms. Alfonso is a 24 year old year old female who presents to clinic today with subacute right thumb pain over the past 1.5 months, believed to be a hyperextension injury.  No pain or laxity at IP joint to suggest UCL sprain/injury at this time.  Tenderness and pain concentrates in region of thenar eminence.  Suspected thenar muscle injury.    Diagnosis: Acute pain of right thumb    -Thumb spica brace as needed for painful activity; will request thermoplast splint with OT  -Hand therapy referral for progressing HEP  -Continue limited HEP provided  -Ice, analgesics OTC as needed  -Work related injury; provided fax number for any paperwork  -Follow up 4 weeks if persisting    It was a pleasure seeing Joseph today.    Javier Christianson, DO, CAQSM  Primary Care Sports Medicine

## 2020-04-20 ENCOUNTER — PRE VISIT (OUTPATIENT)
Dept: ORTHOPEDICS | Facility: CLINIC | Age: 25
End: 2020-04-20

## 2020-11-10 ENCOUNTER — TELEPHONE (OUTPATIENT)
Dept: ORTHOPEDICS | Facility: CLINIC | Age: 25
End: 2020-11-10

## 2020-11-10 NOTE — TELEPHONE ENCOUNTER
[facility] Call Center    Phone Message    May a detailed message be left on voicemail: yes     Reason for Call: Other: Pt of Dr. Christianson's calling stating she needs an updated PT order per her physical therapist.  Pt is requesting a new order be put in her chart please, she would also like a call when this has been done     Action Taken: Message routed to:  [facility]    Travel Screening: Not Applicable

## 2020-11-11 DIAGNOSIS — S69.91XD THUMB INJURY, RIGHT, SUBSEQUENT ENCOUNTER: Primary | ICD-10-CM

## 2020-11-12 NOTE — TELEPHONE ENCOUNTER
[facility] Call Center    Phone Message    May a detailed message be left on voicemail: yes     Reason for Call: Other: Pt of Dr. Wen calling in asking if someone on the care team can fax a copy of the hand therapy order to her Work Comp, Attannmarie Oropeza and fax number is [phone]      Pt is requesting a call back when this has been done and it is ok to leave a detailed message for the pt.     Action Taken: Message routed to:  [facility]    Travel Screening: Not Applicable

## 2020-12-12 ENCOUNTER — OFFICE VISIT (OUTPATIENT)
Dept: ORTHOPEDICS | Facility: CLINIC | Age: 25
End: 2020-12-12
Payer: OTHER MISCELLANEOUS

## 2020-12-12 VITALS — WEIGHT: 150 LBS | HEIGHT: 65 IN | BODY MASS INDEX: 24.99 KG/M2

## 2020-12-12 DIAGNOSIS — S69.91XD THUMB INJURY, RIGHT, SUBSEQUENT ENCOUNTER: Primary | ICD-10-CM

## 2020-12-12 PROCEDURE — 99213 OFFICE O/P EST LOW 20 MIN: CPT | Performed by: FAMILY MEDICINE

## 2020-12-12 ASSESSMENT — MIFFLIN-ST. JEOR: SCORE: 1426.28

## 2020-12-12 NOTE — PROGRESS NOTES
SPORTS & ORTHOPEDIC WALK-IN FOLLOW-UP VISIT 12/12/2020    Interval History:     Follow up reason: R thumb     Date of injury/onset: 2/28/20    Date last seen: 4/11/20    Following Therapeutic Plan: Yes     Pain: Unchanged    Function: Unchanged    Interval History: Was given HT but didn't feel comfortable going in during covid and now would like to do it and needed to be seen. Pain still with repetitive activity.        Medical History:    Any recent changes to your medical history? No    Any new medication prescribed since last visit? No    Review of Systems:    Do you have fever, chills, weight loss? No    Do you have any vision problems? No    Do you have any chest pain or edema? No    Do you have any shortness of breath or wheezing?  No    Do you have stomach problems? No    Do you have any numbness or focal weakness? No    Do you have diabetes? No    Do you have problems with bleeding or clotting? No    Do you have an rashes or other skin lesions? No

## 2020-12-12 NOTE — LETTER
"    12/12/2020         RE: Joseph Alfonso  [address]  [facility] [address]        Dear Colleague,    Thank you for referring your patient, Joseph Alfonso, to the [facility] [place]. Please see a copy of my visit note below.          SPORTS & ORTHOPEDIC WALK-IN FOLLOW-UP VISIT 12/12/2020    Interval History:     Follow up reason: R thumb     Date of injury/onset: 2/28/20    Date last seen: 4/11/20    Following Therapeutic Plan: Yes     Pain: Unchanged    Function: Unchanged    Interval History: Was given HT but didn't feel comfortable going in during covid and now would like to do it and needed to be seen. Pain still with repetitive activity.        Medical History:    Any recent changes to your medical history? No    Any new medication prescribed since last visit? No    Review of Systems:    Do you have fever, chills, weight loss? No    Do you have any vision problems? No    Do you have any chest pain or edema? No    Do you have any shortness of breath or wheezing?  No    Do you have stomach problems? No    Do you have any numbness or focal weakness? No    Do you have diabetes? No    Do you have problems with bleeding or clotting? No    Do you have an rashes or other skin lesions? No             ESTABLISHED PATIENT FOLLOW-UP:  Follow Up of the Right Hand     HISTORY OF PRESENT ILLNESS  Ms. Alfonso is a pleasant 25 year old year old female who presents to clinic today for follow-up of right thumb pain. Suspected thenar strain of thumb on 4/11/20.       Follow up reason: R thumb, dominant hand    Date of injury/onset: 2/28/20    Date last seen: 4/11/20    Following Therapeutic Plan: No     Pain: Unchanged    Function: Unchanged    Interval History: Was given script for OT but didn't feel comfortable going in during covid and now would like to do it and needed to be seen. Pain still with repetitive activity.     Historical: \"Ms. Alfonso is a pleasant 24 year old " "year old female who presents to clinic today with pain of right thumb.  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  She believes her thumb hyperextended.  Felt pop but unsure if it was related to her thumb or person's head striking her.\"    Additional medical/Social/Surgical histories reviewed in [facility] and updated as appropriate.    REVIEW OF SYSTEMS (12/13/2020)  CONSTITUTIONAL: Denies fever and weight loss  GASTROINTESTINAL: Denies abdominal pain, nausea, vomiting  MUSCULOSKELETAL: See HPI  SKIN: Denies any recent rash or lesion  NEUROLOGICAL: Denies numbness or focal weakness     PHYSICAL EXAM  Ht 1.651 m (5' 5\")   Wt 68 kg (150 lb)   BMI 24.96 kg/m      General  - normal appearance, in no obvious distress  Musculoskeletal -right thumb  - inspection: no atrophy, normal joint alignment, no swelling  - palpation: no CMC tenderness, no soft tissue tenderness, no tenderness at the anatomical snuffbox, nontender at IP joint circumferentially.  No tenderness greatest in thenar eminence.  - ROM:  MCP  70 deg flexion    0 deg extension              IP         90 deg flexion    0 deg extension  - strength: Mild pain with resisted flexion of of MP and IP joint.  5/5 remaining planes.  - special tests IP joint  (-) varus  (-) valgus  Neuro  - no numbness, no motor deficit, grossly normal coordination, normal muscle tone  Skin  - no ecchymosis, erythema, warmth, or induration, no obvious rash  Psych  - interactive, appropriate, normal mood and affect    IMAGING :   Interface, Rad[facility] - 02/28/2020  8:47 PM CST  Indication: Thumb hyper-extended      Comparison: None    Findings: 3 views of the right thumb are obtained. No acute fracture identified. No significant soft tissue swelling..    IMPRESSION  Impression:  No acute osseous abnormality.    Reading Radiologist: King Cervantes      ASSESSMENT & PLAN  Ms. Alfonso is a 25 year old year old female " who presents to clinic today with ongoing mild pain of right thumb. Has improved overall in last 6 months, however mild pain persists with .    Diagnosis: Chronic pain of right thumb.    -Encouraged OT at this time, new order provided  -Continue activity as tolerated, no restrictions from my standpoint  -Follow up in 6 weeks, consider MR if continues to persist.    It was a pleasure seeing Joseph.    Javier Christianson DO, CAM  Primary Care Sports Medicine

## 2020-12-13 NOTE — PROGRESS NOTES
"ESTABLISHED PATIENT FOLLOW-UP:  Follow Up of the Right Hand     HISTORY OF PRESENT ILLNESS  Ms. Alfonso is a pleasant 25 year old year old female who presents to clinic today for follow-up of right thumb pain. Suspected thenar strain of thumb on 4/11/20.       Follow up reason: R thumb, dominant hand    Date of injury/onset: 2/28/20    Date last seen: 4/11/20    Following Therapeutic Plan: No     Pain: Unchanged    Function: Unchanged    Interval History: Was given script for OT but didn't feel comfortable going in during covid and now would like to do it and needed to be seen. Pain still with repetitive activity.     Historical: \"Ms. Alfonso is a pleasant 24 year old year old female who presents to clinic today with pain of right thumb.  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  She believes her thumb hyperextended.  Felt pop but unsure if it was related to her thumb or person's head striking her.\"    Additional medical/Social/Surgical histories reviewed in [facility] and updated as appropriate.    REVIEW OF SYSTEMS (12/13/2020)  CONSTITUTIONAL: Denies fever and weight loss  GASTROINTESTINAL: Denies abdominal pain, nausea, vomiting  MUSCULOSKELETAL: See HPI  SKIN: Denies any recent rash or lesion  NEUROLOGICAL: Denies numbness or focal weakness     PHYSICAL EXAM  Ht 1.651 m (5' 5\")   Wt 68 kg (150 lb)   BMI 24.96 kg/m      General  - normal appearance, in no obvious distress  Musculoskeletal -right thumb  - inspection: no atrophy, normal joint alignment, no swelling  - palpation: no CMC tenderness, no soft tissue tenderness, no tenderness at the anatomical snuffbox, nontender at IP joint circumferentially.  No tenderness greatest in thenar eminence.  - ROM:  MCP  70 deg flexion    0 deg extension              IP         90 deg flexion    0 deg extension  - strength: Mild pain with resisted flexion of of MP and IP joint.  5/5 remaining planes.  - " special tests IP joint  (-) varus  (-) valgus  Neuro  - no numbness, no motor deficit, grossly normal coordination, normal muscle tone  Skin  - no ecchymosis, erythema, warmth, or induration, no obvious rash  Psych  - interactive, appropriate, normal mood and affect    IMAGING :   Interface, Radiology[facility] - 02/28/2020  8:47 PM CST  Indication: Thumb hyper-extended      Comparison: None    Findings: 3 views of the right thumb are obtained. No acute fracture identified. No significant soft tissue swelling..    IMPRESSION  Impression:  No acute osseous abnormality.    Reading Radiologist: King Cervantes      ASSESSMENT & PLAN  Ms. Alfonso is a 25 year old year old female who presents to clinic today with ongoing mild pain of right thumb. Has improved overall in last 6 months, however mild pain persists with .    Diagnosis: Chronic pain of right thumb.    -Encouraged OT at this time, new order provided  -Continue activity as tolerated, no restrictions from my standpoint  -Follow up in 6 weeks, consider MR if continues to persist.    It was a pleasure seeing Joseph.    Javier Christianson DO, CAM  Primary Care Sports Medicine

## 2020-12-17 ENCOUNTER — THERAPY VISIT (OUTPATIENT)
Dept: OCCUPATIONAL THERAPY | Facility: CLINIC | Age: 25
End: 2020-12-17
Payer: OTHER MISCELLANEOUS

## 2020-12-17 DIAGNOSIS — M79.644 THUMB PAIN, RIGHT: ICD-10-CM

## 2020-12-17 DIAGNOSIS — S69.91XD THUMB INJURY, RIGHT, SUBSEQUENT ENCOUNTER: ICD-10-CM

## 2020-12-17 PROCEDURE — 97110 THERAPEUTIC EXERCISES: CPT | Mod: GO | Performed by: OCCUPATIONAL THERAPIST

## 2020-12-17 PROCEDURE — 97140 MANUAL THERAPY 1/> REGIONS: CPT | Mod: GO | Performed by: OCCUPATIONAL THERAPIST

## 2020-12-17 PROCEDURE — 97165 OT EVAL LOW COMPLEX 30 MIN: CPT | Mod: GO | Performed by: OCCUPATIONAL THERAPIST

## 2020-12-17 NOTE — PROGRESS NOTES
Hand Therapy Initial Evaluation    Current Date:  12/17/2020    Diagnosis: R thumb pain  DOI: 02/28/20   Procedure:  none    Precautions: NA    Subjective:  Joseph Alfonso is a 25 year old female.    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  She believes her thumb hyperextended.  Felt pop but unsure if it was related to her thumb or person's head striking her. Since onset symptoms are Gradually getting better.  General health as reported by patient is excellent.  Pertinent medical history includes:No past medical history on file.  Medical allergies:none.  Surgical history: none.  Medication history: see chart, ibuprofen as needed.    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    Previous treatment: splinted  Barriers include:none  Mobility: No difficulty  Transportation: drives  Currently working in normal job without restrictions  Leisure activities/hobbies: gardening, biking, video games    Functional Outcome Measure:   Upper Extremity Functional Index Score:  SCORE:   Column Totals: /80: 61   (A lower score indicates greater disability.)    Objective:  Pain Level (Scale 0-10)   12/17/2020   At Rest 1/10   At worst 4/10     Pain Description  Date 12/17/2020   Location R thumb MP through IP area   Pain Quality Dull, Sharp, Throbbing and Tingling   Frequency intermittent     Pain is worst  daytime   Exacerbated by  use   Relieved by heat and rest   Progression improving     Edema  None    Sensation   Some tingling in thumb    ROM  Thumb 12/17/2020 12/17/2020   AROM  (PROM) L R    MP -7/83 -11/75   IP +/67 +/82   RABD 60 56   PABD 59 61     Strength   (Measured in pounds)  Pain Report: - none  + mild    ++ moderate    +++ severe    12/17/2020 12/17/2020   Trials L R   1  2  3 44  54  59 38  47  4   Average 52 30     Lat Pinch 12/17/2020 12/17/2020   Trials L R   1  2  3 10  11  11 7  8  8   Average 11 8     3 Pt Pinch 12/17/2020 12/17/2020   Trials L R   1  2  3 8  7  10 5+  4+  4+   Average 8 4       Assessment:  Patient presents with symptoms consistent with diagnosis of right thumb  pain, with conservative intervention.     Patient's limitations or Problem List includes:  Pain and Weakness of the right thumb which interferes with the patient's ability to perform Self Care Tasks (dressing, eating, bathing, hygiene/toileting), Work Tasks, Sleep Patterns, Recreational Activities, Household Chores and Driving  as compared to previous level of function.    Rehab Potential:  Excellent - Return to full activity, no limitations    Patient will benefit from skilled Occupational Therapy to increase ROM, overall strength and stability of thumb and decrease pain and edema to return to previous activity level and resume normal daily tasks and to reach their rehab potential.    Barriers to Learning:  No barrier    Communication Issues:  Patient appears to be able to clearly communicate and understand verbal and written communication and follow directions correctly.    Chart Review: Chart Review and Simple history review with patient    Identified Performance Deficits: bathing/showering, toileting, dressing, feeding, hygiene and grooming, work and leisure activities    Assessment of Occupational Performance:  3-5 Performance Deficits    Clinical Decision Making (Complexity): Low complexity    Treatment Explanation:  The following has been discussed with the patient:  RX ordered/plan of care  Anticipated outcomes  Possible risks and side effects    Plan:  Frequency:  1 X week, once daily  Duration:   for 6 weeks    Treatment Plan:   Modalities:  US and Paraffin  Therapeutic Exercise:  AROM, AAROM, PROM, Tendon Gliding, Blocking, Isotonics, Isometrics and Stabilization  Manual Techniques:  Joint mobilization, Friction massage and Myofascial release  Orthotic Fabrication:  Static orthosis  Self Care:  Self Care Tasks    Discharge Plan:  Achieve all LTG.  Independent in home treatment program.  Reach maximal therapeutic benefit.    Home Exercise Program:  AROM of thumb  CMC stability exercises  Massage to thenar muscles    Next Visit:  Stability  MFR

## 2020-12-21 ENCOUNTER — THERAPY VISIT (OUTPATIENT)
Dept: OCCUPATIONAL THERAPY | Facility: CLINIC | Age: 25
End: 2020-12-21
Payer: OTHER MISCELLANEOUS

## 2020-12-21 DIAGNOSIS — M79.644 THUMB PAIN, RIGHT: ICD-10-CM

## 2020-12-21 DIAGNOSIS — S69.91XD THUMB INJURY, RIGHT, SUBSEQUENT ENCOUNTER: ICD-10-CM

## 2020-12-21 PROCEDURE — 97760 ORTHOTIC MGMT&TRAING 1ST ENC: CPT | Mod: GO | Performed by: OCCUPATIONAL THERAPIST

## 2020-12-21 PROCEDURE — 97140 MANUAL THERAPY 1/> REGIONS: CPT | Mod: GO | Performed by: OCCUPATIONAL THERAPIST

## 2020-12-21 PROCEDURE — 97110 THERAPEUTIC EXERCISES: CPT | Mod: GO | Performed by: OCCUPATIONAL THERAPIST

## 2021-01-14 ENCOUNTER — HEALTH MAINTENANCE LETTER (OUTPATIENT)
Age: 26
End: 2021-01-14

## 2021-01-28 PROBLEM — S69.91XD THUMB INJURY, RIGHT, SUBSEQUENT ENCOUNTER: Status: RESOLVED | Noted: 2020-12-17 | Resolved: 2021-01-28

## 2021-01-28 PROBLEM — M79.644 THUMB PAIN, RIGHT: Status: RESOLVED | Noted: 2020-12-17 | Resolved: 2021-01-28

## 2021-03-15 ENCOUNTER — VIRTUAL VISIT (OUTPATIENT)
Dept: INTERNAL MEDICINE | Facility: CLINIC | Age: 26
End: 2021-03-15
Payer: COMMERCIAL

## 2021-03-15 DIAGNOSIS — N93.9 VAGINAL BLEEDING: ICD-10-CM

## 2021-03-15 DIAGNOSIS — Z97.5 NEXPLANON IN PLACE: ICD-10-CM

## 2021-03-15 DIAGNOSIS — Z00.00 PREVENTATIVE HEALTH CARE: ICD-10-CM

## 2021-03-15 DIAGNOSIS — R11.0 NAUSEA: Primary | ICD-10-CM

## 2021-03-15 PROCEDURE — 99203 OFFICE O/P NEW LOW 30 MIN: CPT | Mod: GT | Performed by: PEDIATRICS

## 2021-03-15 NOTE — ASSESSMENT & PLAN NOTE
Vaginal bleeding  Questions about birth control, vaginal bleeding. She has nexplanon 2.5 years. She has 'constant' bleeding, and periods can be irregular. [facility] She tried adding norethindrone, but felt more nauseated with that.  Counseled about contraceptive options. If vaginal bleeding continues despite the ibuprofen they recommended, then in theory the nexplanon can be removed. Only problem then is to come up with a viable alternative to prevent pregnancy. I went through the options.  She had 2-component pill, age 16, but stopped taking this because of calf muscle cramps. In theory this could be tried again, if the nexplanon is to be removed.

## 2021-03-15 NOTE — ASSESSMENT & PLAN NOTE
Preventive health care  It has been 6 years since been seen for a preventive visit. She wants to get that set up in the future. She has had [facility]'s health care at [facility] (which is almost certainly fine ... recommendations now are a young-adult physical, and continue with GYN care of choice).

## 2021-03-15 NOTE — NURSING NOTE
"Chief Complaint   Patient presents with     Establish Care     Pt reports \"not feeling well\". Symptoms for the last 2 weeks of nausea, loss of appetite, pain, bloating, (no vomit or diarrhea), pt reports sometimes feeling better after eating       Kathy Nissen, EMT at 8:45 AM on 3/15/2021    "

## 2021-03-15 NOTE — ASSESSMENT & PLAN NOTE
Nausea and abdominal bloating, pain  UPDATE: 2.5 weeks of nausea, abdominal pain, bloating. She doesn't feel like eating much. The pain and bloating comes and goes. Daily BM not pain. Nonlactose milk, usually does ok. Has some hot sensation in the back of her throat, especially morning. Will have cereal or peanut butter toast. Drinks black tea, a couple cups per week.  No recent menstrual changes.  ASSESSMENT & PLAN: No danger signs for immediately to see. Counseled about food options, danger signs, and GERD symptoms.   Will follow-up discussion in person when can do exam.

## 2021-03-15 NOTE — PROGRESS NOTES
"    Dear patient. I use the medical record to document (to the best of my ability) my understanding of:   - What you told me,  - Relevant details from my exam, records review, and/or test results, and  - My assessment and plan  If you have questions, you are welcome to contact me; I will reply as soon as time allows.      Virtual Visit Details    Type of service:  Video Visit    Start Time: 9:19 AM    End Time:9:38 PM    Originating Location (pt. Location): Home    Distant Location (provider location):  [facility]     Platform used for Visit: Peña    PCP: Chele Sutherland  Visit type: problem-oriented  Time note ((n3, 30'): The total time (on the date of service) for this service was 30 minutes, including discussion/face-to-face, chart review, interpretation not otherwise reported, documentation, and updating of the computerized record.        Context    Joseph Alfonso is a 25 year old woman, with concerns including:  Chief Complaint   Patient presents with     [facility]     Pt reports \"not feeling well\". Symptoms for the last 2 weeks of nausea, loss of appetite, pain, bloating, (no vomit or diarrhea), pt reports sometimes feeling better after eating       History, update, and/or problems        Nausea and abdominal bloating, pain  UPDATE: 2.5 weeks of nausea, abdominal pain, bloating. She doesn't feel like eating much. The pain and bloating comes and goes. Daily BM not pain. Nonlactose milk, usually does ok. Has some hot sensation in the back of her throat, especially morning. Will have cereal or peanut butter toast. Drinks black tea, a couple cups per week.  No recent menstrual changes.  ASSESSMENT & PLAN: No danger signs for immediately to see. Counseled about food options, danger signs, and GERD symptoms.   Will follow-up discussion in person when can do exam.      Vaginal bleeding  Questions about birth control, vaginal bleeding. She has nexplanon 2.5 years. She has " constant bleeding, and periods can be irregular. [facility] She tried adding norethindrone, but felt more nauseated with that.  Counseled about contraceptive options. If vaginal bleeding continues despite the ibuprofen they recommended, then in theory the nexplanon can be removed. Only problem then is to come up with a viable alternative to prevent pregnancy. I went through the options.  She had 2-component pill, age 16, but stopped taking this because of calf muscle cramps. In theory this could be tried again, if the nexplanon is to be removed.      Plan:  1. Pregnancy test outpatient (at [facility] or [facility]). Talk with [facility] about other options than nexplanon.  2. Make appointment for further evaluation about nausea and abdominal pain.    Preventive health care  It has been 6 years since been seen for a preventive visit. She wants to get that set up in the future. She has had [facility]'s [facility] care at [facility] (which is almost certainly fine ... recommendations now are a young-adult physical, and continue with GYN care of choice).      General comments      Physical exam as possible  Physical Exam  Constitutional:       General: She is not in acute distress.     Appearance: Normal appearance. She is not ill-appearing.   HENT:      Head: Normocephalic.      Right Ear: External ear normal.      Left Ear: External ear normal.      Nose: Nose normal.   Eyes:      General: No scleral icterus.        Right eye: No discharge.         Left eye: No discharge.      Extraocular Movements: Extraocular movements intact.   Pulmonary:      Effort: Pulmonary effort is normal. No respiratory distress.      Comments: No audible wheeze or stridor. No visible cyanosis.  Skin:     Comments: Visible skin is clear, without obvious rash or lesions   Neurological:      Mental Status: She is alert.      Comments: Cranial nerves appear grossly normal   Psychiatric:         Mood and Affect: Mood normal.          Speech: Speech normal.         Behavior: Behavior normal.         Thought Content: Thought content normal.

## 2021-10-23 ENCOUNTER — HEALTH MAINTENANCE LETTER (OUTPATIENT)
Age: 26
End: 2021-10-23

## 2022-02-12 ENCOUNTER — HEALTH MAINTENANCE LETTER (OUTPATIENT)
Age: 27
End: 2022-02-12

## 2022-10-10 ENCOUNTER — HEALTH MAINTENANCE LETTER (OUTPATIENT)
Age: 27
End: 2022-10-10

## 2023-03-25 ENCOUNTER — HEALTH MAINTENANCE LETTER (OUTPATIENT)
Age: 28
End: 2023-03-25